# Patient Record
Sex: FEMALE | Race: WHITE | NOT HISPANIC OR LATINO | URBAN - METROPOLITAN AREA
[De-identification: names, ages, dates, MRNs, and addresses within clinical notes are randomized per-mention and may not be internally consistent; named-entity substitution may affect disease eponyms.]

---

## 2019-02-28 ENCOUNTER — TRANSFERRED RECORDS (OUTPATIENT)
Dept: HEALTH INFORMATION MANAGEMENT | Facility: CLINIC | Age: 29
End: 2019-02-28

## 2019-06-18 ENCOUNTER — TRANSCRIBE ORDERS (OUTPATIENT)
Dept: MATERNAL FETAL MEDICINE | Facility: CLINIC | Age: 29
End: 2019-06-18

## 2019-06-18 ENCOUNTER — MEDICAL CORRESPONDENCE (OUTPATIENT)
Dept: HEALTH INFORMATION MANAGEMENT | Facility: CLINIC | Age: 29
End: 2019-06-18

## 2019-06-18 ENCOUNTER — TRANSFERRED RECORDS (OUTPATIENT)
Dept: HEALTH INFORMATION MANAGEMENT | Facility: CLINIC | Age: 29
End: 2019-06-18

## 2019-06-18 DIAGNOSIS — O26.90 PREGNANCY RELATED CONDITION, ANTEPARTUM: Primary | ICD-10-CM

## 2019-07-08 ENCOUNTER — PRE VISIT (OUTPATIENT)
Dept: MATERNAL FETAL MEDICINE | Facility: CLINIC | Age: 29
End: 2019-07-08

## 2019-07-10 ENCOUNTER — HOSPITAL ENCOUNTER (OUTPATIENT)
Dept: CARDIOLOGY | Facility: CLINIC | Age: 29
Discharge: HOME | End: 2019-07-10
Admitting: PEDIATRICS
Payer: COMMERCIAL

## 2019-07-10 DIAGNOSIS — O26.90 PREGNANCY RELATED CONDITION, ANTEPARTUM: ICD-10-CM

## 2019-07-10 PROCEDURE — 76825 ECHO EXAM OF FETAL HEART: CPT

## 2019-07-18 ENCOUNTER — TELEPHONE (OUTPATIENT)
Dept: SURGERY | Facility: CLINIC | Age: 29
End: 2019-07-18

## 2019-08-15 ENCOUNTER — OFFICE VISIT (OUTPATIENT)
Dept: SURGERY | Facility: CLINIC | Age: 29
End: 2019-08-15
Attending: SURGERY
Payer: COMMERCIAL

## 2019-08-15 DIAGNOSIS — Z34.93 PREGNANCY WITH ONE FETUS IN THIRD TRIMESTER: ICD-10-CM

## 2019-08-15 PROBLEM — Z34.90 PREGNANCY WITH ONE FETUS: Status: ACTIVE | Noted: 2019-08-15

## 2019-08-15 PROCEDURE — 99202 OFFICE O/P NEW SF 15 MIN: CPT | Mod: ZP | Performed by: SURGERY

## 2019-08-15 RX ORDER — CITALOPRAM HYDROBROMIDE 40 MG/1
40 TABLET ORAL
COMMUNITY
Start: 2019-02-06 | End: 2020-02-06

## 2019-08-15 NOTE — PROGRESS NOTES
August 15, 2019         Gali Mars DO   [facility]   [address]   [place]  [address]      RE: Savana Ortiz   MRN: [id number]   : 1990      Dear Dr. 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      As you recall, Savana is an otherwise very healthy 29-year-old female.  She is expecting her second child.  The other child was delivered normally at term and is very healthy.      Her prenatal screening ultrasounds and subsequent ultrasounds have demonstrated to have a cystic mass in the left lower chest consistent with a CPAM.  It appears to be roughly 4 x 2 x 1.5 cm.  It is unclear if it has any feeding vessels from the aorta.  The child also has had a normal cardiac ultrasound.      I had a very pleasant 20-minute visit with them which was spent 100% in counseling and discussion regarding the normal paths of CPAMs, their postdelivery course and potential need for operation on.      They understand that their child is growing and developing well and they may deliver any placed at their discretion to discuss with their perinatologist and obstetrician.  We recommend a chest x-ray after delivery of the infant and a good physical exam and would love to see the child back in clinic postnatally.      We would plan to get a CT of his chest at roughly 3 months of age and monitor his CPM.  Certainly if he becomes symptomatic from a respiratory standpoint, we would move that schedule up, but typically these children do well and we would plan for thoracoscopic excision at roughly 4-6 months of age.      Again, thank you very much for allowing me to participate in Ms. Ortiz's care.  If I can be of any further assistance, please do not  hesitate to ask.      Sincerely,      Presley Bishop MD

## 2019-08-15 NOTE — LETTER
8/15/2019      RE: Savana Ortiz  [address] [facility] [address]       August 15, 2019         Gali Mars, DO   [facility]   [address]   [place]  [address]      RE: Savana Ortiz   MRN: [id number]   : 1990      Dear Dr. 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      As you recall, Savana is an otherwise very healthy 29-year-old female.  She is expecting her second child.  The other child was delivered normally at term and is very healthy.      Her prenatal screening ultrasounds and subsequent ultrasounds have demonstrated to have a cystic mass in the left lower chest consistent with a CPAM.  It appears to be roughly 4 x 2 x 1.5 cm.  It is unclear if it has any feeding vessels from the aorta.  The child also has had a normal cardiac ultrasound.      I had a very pleasant 20-minute visit with them which was spent 100% in counseling and discussion regarding the normal paths of CPAMs, their postdelivery course and potential need for operation on.      They understand that their child is growing and developing well and they may deliver any placed at their discretion to discuss with their perinatologist and obstetrician.  We recommend a chest x-ray after delivery of the infant and a good physical exam and would love to see the child back in clinic postnatally.      We would plan to get a CT of his chest at roughly 3 months of age and monitor his CPM.  Certainly if he becomes symptomatic from a respiratory standpoint, we would move that schedule up, but typically these children do well and we would plan for thoracoscopic excision at roughly 4-6 months of age.      Again, thank you very much for allowing me to  participate in Ms. Ortiz's care.  If I can be of any further assistance, please do not hesitate to ask.      Sincerely,      Presley Bishop MD

## 2019-08-15 NOTE — NURSING NOTE
NREQQIC [[id number]]  Chief Complaint   Patient presents with     Consult     pt being seen in surgery clinic for consult fetal CPAM/BPS     Initial LMP 01/01/2019  There is no height or weight on file to calculate BMI.  Medication Reconciliation: complete   Wendie Love LPN

## 2019-08-21 ENCOUNTER — TELEPHONE (OUTPATIENT)
Dept: UROLOGY | Facility: CLINIC | Age: 29
End: 2019-08-21

## 2019-09-10 ENCOUNTER — OFFICE VISIT (OUTPATIENT)
Dept: UROLOGY | Facility: CLINIC | Age: 29
End: 2019-09-10
Attending: NURSE PRACTITIONER
Payer: COMMERCIAL

## 2019-09-10 DIAGNOSIS — O35.EXX0 PYELECTASIS OF FETUS ON PRENATAL ULTRASOUND: Primary | ICD-10-CM

## 2019-09-10 PROCEDURE — G0463 HOSPITAL OUTPT CLINIC VISIT: HCPCS | Mod: ZF

## 2019-09-10 NOTE — PROGRESS NOTES
Savana Mireles  [facility] [address] [facility] [address]    RE:  Savana Ortiz  :  1990  [place] MRN:  [id number]  Date of visit:  September 10, 2019        Dear Dr. Mireles:    I had the pleasure of seeing your patient, Savana, today through the [facility] in consultation for the question of fetal pyelectasis.  Please see below the details of this visit and my impression and plans discussed with the family.        CC:  Consult (Patient is being seen for consulation in urology)       HPI:  Savana is a 29 year old woman whom I was asked to see in consultation for the above.  This is Savana's 2nd pregnancy.  The sex of the fetus is male.  At the 33 week ultrasound it was noted that there was dilation of the left renal pelvis without dilation of the calyces and parenchyma of normal echognicity and thickness (UTD A1).  However, in review of the images, it appears to my eye that there is some dilation of the calyces.  Left hydroureter was noted on ultrasound (making this actually UTD A2) and bladder wall was felt to appear thickened.   Bladder and kidneys were noted to be normal on all previous ultrasounds.  Savana recently had a consult with Dr. Bishop with pediatric general surgery to discuss the prenatal finding of a cystic mass in baby's left lower chest consistent with congenital pulmonary airway malformation (CPAM).  The plan for the mass after baby is born is to get a CT scan around 3 months of age and thoracoscopic excision around 4-6 months of age.  Baby has had a normal cardiac ultrasound, which is reassuring.  The pregnancy has been been otherwise uncomplicated.  Savana is planning to deliver at [facility] with a planned  on .  Parents are planning to have baby circumcised.  There is no known family history of genitourinary disorders in childhood.      PMH:  No past  medical history on file.    PSH:   No past surgical history on file.    Meds, allergies, family history, social history reviewed per intake form and confirmed in our EMR.    ROS:  Negative on a 12-point scale, except for pertinent positives mentioned in the HPI.    PE:  Last menstrual period 01/01/2019.  There is no height or weight on file to calculate BMI.  General:  Well-appearing woman, in no apparent distress.  HEENT:  Normocephalic, normal facies  Resp:  Symmetric chest wall movement, no audible respirations  Abd:  gravid  Skin:  Warm, well-perfused      Impression:  Fetus with pelvocaliectasis, left hydroureter, and mildly thickened bladder wall (UTD A2-3), which was a new finding at the 33 week ultrasound.        Diagnoses       Codes Comments    Pyelectasis of fetus on prenatal ultrasound    -  Primary O35.8XX0            Plan:    We discussed the likely prenatal causes for this, including prenatal obstructive issues that have already resolved versus those that may need surgical help with resolution in childhood, as well as the possibility of vesicoureteral reflux.  We discussed the risks for urinary tract infection, and the pros and cons of starting the baby on daily, low-dose Amoxicillin, dosed at 10 mg/kg/d, which in this case we will likely not do. We also made our plans for follow-up with myself or our urologist, Dr. Mable Bey, after the baby is born with renal/bladder ultrasound around 4 weeks of age.  I addressed all questions and encouraged a phone call from their MFM if there are any future concerns during the pregnancy.        Thank you very much for allowing me the opportunity to participate in this nice family's care with you.    Sincerely,    ROBYN Calle, CPNP  Pediatric Urology, [facility]

## 2019-09-10 NOTE — LETTER
9/10/2019      RE: Savana Ortiz  [address] [facility] [address]       GonzaloSavana boland  [facility] [address] [facility] [address]    RE:  Savana Ortiz  :  1990  [place] MRN:  [id number]  Date of visit:  September 10, 2019        Dear Dr. Mireles:    I had the pleasure of seeing your patient, Savana, today through the [facility] in consultation for the question of fetal pyelectasis.  Please see below the details of this visit and my impression and plans discussed with the family.        CC:  Consult (Patient is being seen for consulation in urology)       HPI:  Savana is a 29 year old woman whom I was asked to see in consultation for the above.  This is Savana's 2nd pregnancy.  The sex of the fetus is male.  At the 33 week ultrasound it was noted that there was dilation of the left renal pelvis without dilation of the calyces and parenchyma of normal echognicity and thickness (UTD A1).  However, in review of the images, it appears to my eye that there is some dilation of the calyces.  Left hydroureter was noted on ultrasound (making this actually UTD A2) and bladder wall was felt to appear thickened.   Bladder and kidneys were noted to be normal on all previous ultrasounds.  Savana recently had a consult with Dr. Bishop with pediatric general surgery to discuss the prenatal finding of a cystic mass in baby's left lower chest consistent with congenital pulmonary airway malformation (CPAM).  The plan for the mass after baby is born is to get a CT scan around 3 months of age and thoracoscopic excision around 4-6 months of age.  Baby has had a normal cardiac ultrasound, which is reassuring.  The pregnancy has been been otherwise uncomplicated.  Savana is planning to deliver at [facility] with a planned  on .  Parents are planning to have baby circumcised.  There  is no known family history of genitourinary disorders in childhood.      PMH:  No past medical history on file.    PSH:   No past surgical history on file.    Meds, allergies, family history, social history reviewed per intake form and confirmed in our EMR.    ROS:  Negative on a 12-point scale, except for pertinent positives mentioned in the HPI.    PE:  Last menstrual period 01/01/2019.  There is no height or weight on file to calculate BMI.  General:  Well-appearing woman, in no apparent distress.  HEENT:  Normocephalic, normal facies  Resp:  Symmetric chest wall movement, no audible respirations  Abd:  gravid  Skin:  Warm, well-perfused      Impression:  Fetus with pelvocaliectasis, left hydroureter, and mildly thickened bladder wall (UTD A2-3), which was a new finding at the 33 week ultrasound.        Diagnoses       Codes Comments    Pyelectasis of fetus on prenatal ultrasound    -  Primary O35.8XX0            Plan:    We discussed the likely prenatal causes for this, including prenatal obstructive issues that have already resolved versus those that may need surgical help with resolution in childhood, as well as the possibility of vesicoureteral reflux.  We discussed the risks for urinary tract infection, and the pros and cons of starting the baby on daily, low-dose Amoxicillin, dosed at 10 mg/kg/d, which in this case we will likely not do. We also made our plans for follow-up with myself or our urologist, Dr. Mable Bey, after the baby is born with renal/bladder ultrasound around 4 weeks of age.  I addressed all questions and encouraged a phone call from their MFM if there are any future concerns during the pregnancy.        Thank you very much for allowing me the opportunity to participate in this nice family's care with you.    Sincerely,    ROBYN Calle, CPNP  Pediatric Urology, [facility]    ROBYN Nance CNP

## 2019-09-10 NOTE — NURSING NOTE
NREQQIC [[id number]]  Chief Complaint   Patient presents with     Consult     Patient is being seen for consulation in urology     Initial LMP 01/01/2019  There is no height or weight on file to calculate BMI.  Medication Reconciliation: complete

## 2020-01-20 ENCOUNTER — OFFICE VISIT (OUTPATIENT)
Dept: URGENT CARE | Facility: RETAIL CLINIC | Age: 30
End: 2020-01-20
Payer: COMMERCIAL

## 2020-01-20 VITALS
TEMPERATURE: 100.3 F | OXYGEN SATURATION: 98 % | HEART RATE: 87 BPM | SYSTOLIC BLOOD PRESSURE: 131 MMHG | DIASTOLIC BLOOD PRESSURE: 74 MMHG

## 2020-01-20 DIAGNOSIS — R52 GENERALIZED BODY ACHES: ICD-10-CM

## 2020-01-20 DIAGNOSIS — J10.1 INFLUENZA B: Primary | ICD-10-CM

## 2020-01-20 LAB
FLUAV AG UPPER RESP QL IA.RAPID: NEGATIVE
FLUBV AG UPPER RESP QL IA.RAPID: POSITIVE

## 2020-01-20 PROCEDURE — 99203 OFFICE O/P NEW LOW 30 MIN: CPT | Performed by: PHYSICIAN ASSISTANT

## 2020-01-20 PROCEDURE — 87804 INFLUENZA ASSAY W/OPTIC: CPT | Mod: QW | Performed by: PHYSICIAN ASSISTANT

## 2020-01-20 RX ORDER — OSELTAMIVIR PHOSPHATE 75 MG/1
75 CAPSULE ORAL 2 TIMES DAILY
Qty: 10 CAPSULE | Refills: 0 | Status: SHIPPED | OUTPATIENT
Start: 2020-01-20 | End: 2020-01-25

## 2020-01-20 NOTE — LETTER
[facility]  [address] [facility] [address]  Phone: [phone]         2020    Savana Ortiz  [address] [facility] [address]  [phone] (home)     :     1990      To Whom it May Concern:    This patient was seen and evaluated today at our clinic for an acute illness. Please excuse from work missed  due to this illness. She must be free of fever, body aches and chills for 24 hrs before returning to work.      Please contact me for questions or concerns.    Sincerely,        Yadi Maciel PA-C

## 2020-01-20 NOTE — PATIENT INSTRUCTIONS
Start Tamiflu capsule (antiviral medicine) 1 capsule twice a day for 5 days with food  Remain out of work/activities until 24 HOURS AFTER FEVER/BODY ACHES have resolved.  Keep mask on when around others, even at home.  Maintain hydration by drinking small amounts of clear fluids frequently. Rest. Vaporizer.  Alternate Tylenol and ibuprofen as needed for aches and fever - Remember to count each dose of dayquil and nyquil as a dose of tylenol  Sudafed - decongestant for congestion in ear/sinus/nasal passages  Steam treatments, warm compresses  Saline rinses, afrin nasal spray  Call primary care provider if symptoms worsen, high fever, severe weakness or fainting    Call your son's pediatrician office to discuss your influenza B positive result today and re: your son

## 2020-01-20 NOTE — PROGRESS NOTES
Chief Complaint   Patient presents with     Generalized Body Aches     Cough     dry cough     Fever        SUBJECTIVE:  Patient presents with flu-like symptoms:  Body aches, chills, sweats, and fever since yesterday. Dry cough x few days.  Denies dyspnea or wheezing. Did receive her flu shot this season. Has a 2 month old at home. Taking tylenol to help with body aches  No chronic health issues  Non-smoker    History reviewed. No pertinent past medical history.  Current Outpatient Medications   Medication Sig Dispense Refill     citalopram (CELEXA) 40 MG tablet Take 40 mg by mouth       oseltamivir (TAMIFLU) 75 MG capsule Take 1 capsule (75 mg) by mouth 2 times daily for 5 days 10 capsule 0     Prenatal Multivit-Min-Fe-FA (PRENATAL VITAMINS PO) Take 1 capsule by mouth once        No Known Allergies     History   Smoking Status     Never Smoker   Smokeless Tobacco     Never Used         OBJECITVE;  /74 (Cuff Size: Adult Regular)   Pulse 87   Temp 100.3  F (37.9  C) (Tympanic)   LMP 01/19/2020   SpO2 98%   Appears moderately ill but not toxic.  EARS:  normal.  THROAT AND PHARYNX:  normal.  NECK: supple; no adenopathy in the neck.  CHEST:  clear.    Rapid influenza A neg, B positive    ASSESSMENT:  (J10.1) Influenza B  (primary encounter diagnosis)  (R52) Generalized body aches    PLAN:  Plan: oseltamivir (TAMIFLU) 75 MG capsule  Patient Instructions   Start Tamiflu capsule (antiviral medicine) 1 capsule twice a day for 5 days with food  Remain out of work/activities until 24 HOURS AFTER FEVER/BODY ACHES have resolved.  Keep mask on when around others, even at home.  Maintain hydration by drinking small amounts of clear fluids frequently. Rest. Vaporizer.  Alternate Tylenol and ibuprofen as needed for aches and fever - Remember to count each dose of dayquil and nyquil as a dose of tylenol  Sudafed - decongestant for congestion in ear/sinus/nasal passages  Steam treatments, warm compresses  Saline rinses,  afrin nasal spray  Call primary care provider if symptoms worsen, high fever, severe weakness or fainting  Call your son's pediatrician office to discuss your influenza B positive result today and re: your son    Yadi Maciel PA-C  [facility]

## 2023-04-16 ENCOUNTER — HEALTH MAINTENANCE LETTER (OUTPATIENT)
Age: 33
End: 2023-04-16